# Patient Record
Sex: MALE | URBAN - METROPOLITAN AREA
[De-identification: names, ages, dates, MRNs, and addresses within clinical notes are randomized per-mention and may not be internally consistent; named-entity substitution may affect disease eponyms.]

---

## 2021-09-17 NOTE — PATIENT DISCUSSION
Continue with same eyeglasses.
Discussed condition and exacerbating conditions/situations (e.g., dry/arid environments, overhead fans, air conditioners, side effect of medications).
Monitor.
No Retinal holes, Tears or Detachments.
Patient is doing well following YAG capsulotomy. Signs and symptoms of retinal detachment including flashing and floaters were discussed. Patient was asked to schedule yearly preventative follow-up eye exams with us.
Patient understands condition, prognosis and need for follow up care.
Recommended artificial tears and warm compresses.
Retinal tear and detachment warning symptoms reviewed and patient instructed to call immediately if increasing floaters, flashes, or decreasing peripheral vision.
Reviewed natural history.
Stable.
No

## 2021-11-05 ENCOUNTER — IMPORTED ENCOUNTER (OUTPATIENT)
Dept: URBAN - METROPOLITAN AREA CLINIC 31 | Facility: CLINIC | Age: 36
End: 2021-11-05

## 2021-11-05 PROBLEM — H40.013: Noted: 2021-11-05

## 2021-11-05 PROCEDURE — 99204 OFFICE O/P NEW MOD 45 MIN: CPT

## 2021-11-05 PROCEDURE — 76514 ECHO EXAM OF EYE THICKNESS: CPT

## 2021-11-05 NOTE — PATIENT DISCUSSION
Glaucoma suspect OU -asymmetric C/D R/O  FHX Glaucoma ( Grandparent) Pach done today. Observe off drops. LOW Suspicion Continue care w/ Dr Ivette Aguilera for an appointment with Dr. Christina Gongora.  ALTHEAN

## 2022-04-02 ASSESSMENT — VISUAL ACUITY
OS_SC: 20/20-1
OS_CC: 20/400
OD_SC: 20/20-1
OD_CC: 20/400

## 2022-04-02 ASSESSMENT — TONOMETRY
OD_IOP_MMHG: 18
OD_IOP_MMHG: 24
OS_IOP_MMHG: 22
OS_IOP_MMHG: 16

## 2022-07-09 ENCOUNTER — TELEPHONE ENCOUNTER (OUTPATIENT)
Dept: URBAN - METROPOLITAN AREA CLINIC 121 | Facility: CLINIC | Age: 37
End: 2022-07-09

## 2022-07-09 RX ORDER — OMEPRAZOLE 20 MG/1
TAKE ONE CAPSULE PO DAILY 30-60 MINUTES BEFORE BREAKFAST CAPSULE, DELAYED RELEASE ORAL ONCE A DAY
Refills: 3 | OUTPATIENT
Start: 2015-04-08 | End: 2015-05-13

## 2022-07-09 RX ORDER — CIPROFLOXACIN HYDROCHLORIDE 500 MG/1
TABLET, FILM COATED ORAL ONCE A DAY
Refills: 0 | OUTPATIENT
Start: 2016-01-05 | End: 2016-01-07

## 2022-07-09 RX ORDER — PROMETHAZINE HYDROCHLORIDE 25 MG/1
TABLET ORAL ONCE A DAY
Refills: 0 | OUTPATIENT
Start: 2016-01-07 | End: 2016-01-14

## 2022-07-09 RX ORDER — OMEPRAZOLE 40 MG/1
TAKE ONE CAPSULE PO ONCE DAILY 30-60 MINTUES BEFORE BREAKFAST CAPSULE, DELAYED RELEASE ORAL ONCE A DAY
Refills: 3 | OUTPATIENT
Start: 2015-05-13 | End: 2016-01-14

## 2022-07-09 RX ORDER — CHOLESTYRAMINE 4 G/9G
TAKE 4 GRAMS PO TWICE DAILY POWDER, FOR SUSPENSION ORAL TWICE A DAY
Refills: 2 | OUTPATIENT
Start: 2015-04-08 | End: 2015-05-13

## 2022-07-09 RX ORDER — PROMETHAZINE HYDROCHLORIDE 25 MG/1
TABLET ORAL ONCE A DAY
Refills: 2 | OUTPATIENT
Start: 2016-01-14 | End: 2016-01-14

## 2022-07-10 ENCOUNTER — TELEPHONE ENCOUNTER (OUTPATIENT)
Dept: URBAN - METROPOLITAN AREA CLINIC 121 | Facility: CLINIC | Age: 37
End: 2022-07-10

## 2022-07-10 RX ORDER — CIPROFLOXACIN HYDROCHLORIDE 500 MG/1
TABLET, FILM COATED ORAL ONCE A DAY
Refills: 0 | Status: ACTIVE | COMMUNITY
Start: 2016-01-07

## 2022-07-30 ENCOUNTER — TELEPHONE ENCOUNTER (OUTPATIENT)
Age: 37
End: 2022-07-30

## 2022-07-31 ENCOUNTER — TELEPHONE ENCOUNTER (OUTPATIENT)
Age: 37
End: 2022-07-31